# Patient Record
Sex: MALE | Race: WHITE | NOT HISPANIC OR LATINO | URBAN - METROPOLITAN AREA
[De-identification: names, ages, dates, MRNs, and addresses within clinical notes are randomized per-mention and may not be internally consistent; named-entity substitution may affect disease eponyms.]

---
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
## 2024-03-12 NOTE — ED PEDIATRIC NURSE NOTE - DISCHARGE DATE/TIME
CLINICAL PHARMACY NOTE: MEDS TO BEDS    Total # of Prescriptions Filled: 2   The following medications were delivered to the patient:  Oxycodone-Apap 5-325 mg Tab  Cefdinir 300 mg Cap    Additional Documentation:    
Cottonoids removed from patient's nose.  Patient tolerated well.   
Pt discharged to family.  All personal belongings and discharge instructions taken with patient.  
22-Mar-2017 16:59